# Patient Record
Sex: MALE | Race: WHITE | NOT HISPANIC OR LATINO | Employment: OTHER | URBAN - METROPOLITAN AREA
[De-identification: names, ages, dates, MRNs, and addresses within clinical notes are randomized per-mention and may not be internally consistent; named-entity substitution may affect disease eponyms.]

---

## 2021-02-23 DIAGNOSIS — Z23 NEED FOR VACCINATION: ICD-10-CM

## 2022-06-13 ENCOUNTER — HOSPITAL ENCOUNTER (OUTPATIENT)
Facility: MEDICAL CENTER | Age: 72
End: 2022-06-13
Attending: OPHTHALMOLOGY | Admitting: OPHTHALMOLOGY
Payer: COMMERCIAL

## 2022-06-13 ENCOUNTER — PRE-ADMISSION TESTING (OUTPATIENT)
Dept: ADMISSIONS | Facility: MEDICAL CENTER | Age: 72
End: 2022-06-13
Attending: OPHTHALMOLOGY
Payer: COMMERCIAL

## 2022-06-14 RX ORDER — BALANCED SALT SOLUTION ENRICHED WITH BICARBONATE, DEXTROSE, AND GLUTATHIONE
KIT INTRAOCULAR
Status: DISCONTINUED
Start: 2022-06-14 | End: 2022-06-14 | Stop reason: HOSPADM

## 2022-06-14 RX ORDER — SODIUM CHLORIDE, SODIUM LACTATE, POTASSIUM CHLORIDE, CALCIUM CHLORIDE 600; 310; 30; 20 MG/100ML; MG/100ML; MG/100ML; MG/100ML
INJECTION, SOLUTION INTRAVENOUS CONTINUOUS
Status: CANCELLED | OUTPATIENT
Start: 2022-06-14 | End: 2022-06-14

## 2023-09-07 ENCOUNTER — APPOINTMENT (OUTPATIENT)
Dept: RADIOLOGY | Facility: IMAGING CENTER | Age: 73
End: 2023-09-07
Attending: NURSE PRACTITIONER
Payer: COMMERCIAL

## 2023-09-07 ENCOUNTER — OFFICE VISIT (OUTPATIENT)
Dept: URGENT CARE | Facility: CLINIC | Age: 73
End: 2023-09-07
Payer: COMMERCIAL

## 2023-09-07 VITALS
BODY MASS INDEX: 29.03 KG/M2 | HEART RATE: 68 BPM | SYSTOLIC BLOOD PRESSURE: 114 MMHG | HEIGHT: 74 IN | RESPIRATION RATE: 16 BRPM | WEIGHT: 226.2 LBS | OXYGEN SATURATION: 97 % | TEMPERATURE: 97.6 F | DIASTOLIC BLOOD PRESSURE: 76 MMHG

## 2023-09-07 DIAGNOSIS — G47.00 INSOMNIA, UNSPECIFIED TYPE: ICD-10-CM

## 2023-09-07 DIAGNOSIS — M41.86 LEVOSCOLIOSIS OF LUMBAR SPINE: ICD-10-CM

## 2023-09-07 DIAGNOSIS — M51.36 DDD (DEGENERATIVE DISC DISEASE), LUMBAR: ICD-10-CM

## 2023-09-07 DIAGNOSIS — M54.50 LUMBAR SPINE PAIN: ICD-10-CM

## 2023-09-07 PROBLEM — M77.8 LEFT SHOULDER TENDINITIS: Status: ACTIVE | Noted: 2020-01-14

## 2023-09-07 PROBLEM — M25.819 AFFECTIONS OF SHOULDER REGION: Status: ACTIVE | Noted: 2020-06-09

## 2023-09-07 PROBLEM — J30.89 ENVIRONMENTAL AND SEASONAL ALLERGIES: Status: ACTIVE | Noted: 2018-09-04

## 2023-09-07 PROBLEM — R03.0 ELEVATED BP WITHOUT DIAGNOSIS OF HYPERTENSION: Status: ACTIVE | Noted: 2018-09-04

## 2023-09-07 PROBLEM — R94.5 ABNORMAL RESULTS OF LIVER FUNCTION STUDIES: Status: ACTIVE | Noted: 2020-06-09

## 2023-09-07 PROCEDURE — 72100 X-RAY EXAM L-S SPINE 2/3 VWS: CPT | Mod: TC | Performed by: RADIOLOGY

## 2023-09-07 PROCEDURE — 3074F SYST BP LT 130 MM HG: CPT | Performed by: NURSE PRACTITIONER

## 2023-09-07 PROCEDURE — 99203 OFFICE O/P NEW LOW 30 MIN: CPT | Performed by: NURSE PRACTITIONER

## 2023-09-07 PROCEDURE — 3078F DIAST BP <80 MM HG: CPT | Performed by: NURSE PRACTITIONER

## 2023-09-07 RX ORDER — MELOXICAM 7.5 MG/1
7.5 TABLET ORAL DAILY
Qty: 30 TABLET | Refills: 0 | Status: SHIPPED | OUTPATIENT
Start: 2023-09-07 | End: 2023-10-18 | Stop reason: SDUPTHER

## 2023-09-07 RX ORDER — BACLOFEN 5 MG/1
5-10 TABLET ORAL 3 TIMES DAILY PRN
Qty: 15 TABLET | Refills: 0 | Status: SHIPPED | OUTPATIENT
Start: 2023-09-07 | End: 2023-09-26 | Stop reason: SDUPTHER

## 2023-09-07 ASSESSMENT — ENCOUNTER SYMPTOMS
PARESTHESIAS: 0
WEAKNESS: 0
NUMBNESS: 0
BACK PAIN: 1
BOWEL INCONTINENCE: 0

## 2023-09-07 NOTE — PROGRESS NOTES
"  Subjective:     Tim Doty is a 73 y.o. male who presents for Back Pain (Pt states seen a chiropractor, has been doing a lot of moving and house work x couple months, center of lower back pain, sharp pain, requesting xray to see if align  )      Presents with lower mid back pain for a few months. Was having muscle spasms. Stating he feels he has to increase his effort with forward flexion. At rest, the pain is dull. Change of position increase pain. Denies a specific injury, reporting he has been doing work around the house. Hx of occasional back pain. Saw a chiropractor.    Also inquiring about gabapentin for sleep. Had trailed some his wife had, which helped with his sleep.      Back Pain  This is a recurrent problem. The current episode started more than 1 month ago. Pertinent negatives include no bladder incontinence, bowel incontinence, numbness, paresthesias, perianal numbness or weakness.       Past Medical History:   Diagnosis Date    Alcohol use     \"3 to 4 drinks daily\"       Past Surgical History:   Procedure Laterality Date    SHOULDER ARTHROSCOPY W/ ROTATOR CUFF REPAIR  12/14/2012    Performed by Cinthya Aiken M.D. at SURGERY [facility]S    SHOULDER DECOMPRESSION ARTHROSCOPIC  12/14/2012    Performed by Cinthya Aiken M.D. at SURGERY [facility]S    CLAVICLE DISTAL EXCISION  12/14/2012    Performed by Cinthya Aiken M.D. at SURGERY [facility] ORS    CHOLECYSTECTOMY  1993       Social History     Socioeconomic History    Marital status:      Spouse name: Not on file    Number of children: Not on file    Years of education: Not on file    Highest education level: Not on file   Occupational History    Not on file   Tobacco Use    Smoking status: Never    Smokeless tobacco: Never   Vaping Use    Vaping Use: Never used   Substance and Sexual Activity    Alcohol use: Yes     Comment: \"3 to 4 per day\"    Drug use: No    Sexual activity: Not on file   Other Topics " "Concern    Not on file   Social History Narrative    Not on file     Social Determinants of Health     Financial Resource Strain: Not on file   Food Insecurity: Not on file   Transportation Needs: Not on file   Physical Activity: Not on file   Stress: Not on file   Social Connections: Not on file   Intimate Partner Violence: Not on file   Housing Stability: Not on file        Family History   Problem Relation Age of Onset     () Unknown         Allergies   Allergen Reactions    Pollen Extract      Other reaction(s): Cough  Cough, sneeze, runny nose       Review of Systems   Gastrointestinal:  Negative for bowel incontinence.   Genitourinary:  Negative for bladder incontinence.   Musculoskeletal:  Positive for back pain.   Neurological:  Negative for sensory change, weakness, numbness and paresthesias.   All other systems reviewed and are negative.       Objective:   /76 (BP Location: Left arm, Patient Position: Sitting, BP Cuff Size: Adult)   Pulse 68   Temp 36.4 °C (97.6 °F) (Temporal)   Resp 16   Ht 1.88 m (6' 2\")   Wt 103 kg (226 lb 3.2 oz)   SpO2 97%   BMI 29.04 kg/m²     Physical Exam  Vitals reviewed.   Pulmonary:      Effort: Pulmonary effort is normal. No respiratory distress.   Musculoskeletal:      Comments: Points to mid lumbar spine as area of pain. Area is non-tender to palpation. Pain reporoduced with ROM. Bilateral leg strength is equal, 5/5.    Skin:     General: Skin is warm and dry.   Neurological:      Mental Status: He is alert and oriented to person, place, and time.      Deep Tendon Reflexes:      Reflex Scores:       Patellar reflexes are 2+ on the right side and 2+ on the left side.        Assessment/Plan:   1. Lumbar spine pain  - Referral to establish with [facility]  - DX-LUMBAR SPINE-2 OR 3 VIEWS; Future  - meloxicam (MOBIC) 7.5 MG Tab; Take 1 Tablet by mouth every day.  Dispense: 30 Tablet; Refill: 0  - baclofen (LIORESAL) 5 MG Tab; Take 1-2 Tablets by mouth 3 times a day as " needed (Back pain, muscle spasm.).  Dispense: 15 Tablet; Refill: 0    2. DDD (degenerative disc disease), lumbar  - Referral to establish with [facility]CP  - DX-LUMBAR SPINE-2 OR 3 VIEWS; Future  - meloxicam (MOBIC) 7.5 MG Tab; Take 1 Tablet by mouth every day.  Dispense: 30 Tablet; Refill: 0    3. Levoscoliosis of lumbar spine  - meloxicam (MOBIC) 7.5 MG Tab; Take 1 Tablet by mouth every day.  Dispense: 30 Tablet; Refill: 0    4. Insomnia, unspecified type  - Referral to establish with [facility]    -Take medication as prescribed.   -Can also take OTC Tylenol as directed for pain.   -Temperature Therapy: Heat or ice, whichever feels better.  -Gentle ROM back stretches and exercises.   -Resume activity as tolerated.    Follow up with your primary care doctor. Follow up for persistent, new, or worsening pain. Emergently for weakness, loss of bowel or bladder, groin pain or numbness, fevers.    -Imaging ordered with midline spinal pain, and duration of symptoms.  No saddle anesthesia, leg weakness, or sensory changes. Discussed sedative effects of muscle relaxers. Advised f/u with PCP for continued difficulty with sleep.     Differential diagnosis, natural history, supportive care, and indications for immediate follow-up discussed.

## 2023-09-07 NOTE — PATIENT INSTRUCTIONS
-Take medication as prescribed.   -Can also take OTC Tylenol as directed for pain.   -Temperature Therapy: Heat or ice, whichever feels better.  -Gentle ROM back stretches and exercises.   -Resume activity as tolerated.    Follow up with your primary care doctor. Follow up for persistent, new, or worsening pain. Emergently for weakness, loss of bowel or bladder, groin pain or numbness, fevers.

## 2023-09-12 ASSESSMENT — ENCOUNTER SYMPTOMS
PERIANAL NUMBNESS: 0
SENSORY CHANGE: 0

## 2023-09-15 ENCOUNTER — TELEPHONE (OUTPATIENT)
Dept: HEALTH INFORMATION MANAGEMENT | Facility: OTHER | Age: 73
End: 2023-09-15

## 2023-09-18 ENCOUNTER — TELEPHONE (OUTPATIENT)
Dept: HEALTH INFORMATION MANAGEMENT | Facility: OTHER | Age: 73
End: 2023-09-18
Payer: MEDICARE

## 2023-09-19 ENCOUNTER — OFFICE VISIT (OUTPATIENT)
Dept: MEDICAL GROUP | Facility: PHYSICIAN GROUP | Age: 73
End: 2023-09-19
Attending: NURSE PRACTITIONER
Payer: MEDICARE

## 2023-09-19 VITALS
OXYGEN SATURATION: 96 % | HEART RATE: 62 BPM | HEIGHT: 74 IN | WEIGHT: 229.5 LBS | RESPIRATION RATE: 14 BRPM | DIASTOLIC BLOOD PRESSURE: 76 MMHG | SYSTOLIC BLOOD PRESSURE: 124 MMHG | TEMPERATURE: 97.1 F | BODY MASS INDEX: 29.45 KG/M2
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
## 2023-09-19 DIAGNOSIS — M77.8 LEFT SHOULDER TENDINITIS: ICD-10-CM

## 2023-09-19 DIAGNOSIS — M85.812 OSTEOPENIA OF LEFT SHOULDER: ICD-10-CM

## 2023-09-19 PROCEDURE — 99214 OFFICE O/P EST MOD 30 MIN: CPT | Performed by: STUDENT IN AN ORGANIZED HEALTH CARE EDUCATION/TRAINING PROGRAM

## 2023-09-19 PROCEDURE — 3074F SYST BP LT 130 MM HG: CPT | Performed by: STUDENT IN AN ORGANIZED HEALTH CARE EDUCATION/TRAINING PROGRAM

## 2023-09-19 PROCEDURE — 3078F DIAST BP <80 MM HG: CPT | Performed by: STUDENT IN AN ORGANIZED HEALTH CARE EDUCATION/TRAINING PROGRAM

## 2023-09-19 RX ORDER — HYDROXYZINE HYDROCHLORIDE 25 MG/1
25 TABLET, FILM COATED ORAL 3 TIMES DAILY PRN
Qty: 30 TABLET | Refills: 0 | Status: SHIPPED | OUTPATIENT
Start: 2023-09-19 | End: 2023-10-18

## 2023-09-19 ASSESSMENT — PATIENT HEALTH QUESTIONNAIRE - PHQ9: CLINICAL INTERPRETATION OF PHQ2 SCORE: 0

## 2023-09-19 NOTE — LETTER
[employer] [facility]  Chris Anthony D.O.  [address]  [place] [address]  Fax: [phone]   Authorization for Release/Disclosure of   Protected Health Information   Name: TIM DOTY : 1950 SSN: [id number]   Address: [address] [place] [address] [place] [address] Phone:    [phone] (home)    I authorize the entity listed below to release/disclose the PHI below to:   [facility]/Chris Anthony D.O. and Chris Anthony D.O.   Provider or Entity Name:  [facility]   Address   City, State, [place]   Phone:      Fax:     Reason for request: continuity of care   Information to be released:    [  x] LAST COLONOSCOPY,  including any PATH REPORT and follow-up  [  ] LAST FIT/COLOGUARD RESULT [  ] LAST DEXA  [  ] LAST MAMMOGRAM  [  ] LAST PAP  [  ] LAST LABS [  ] RETINA EXAM REPORT  [  ] IMMUNIZATION RECORDS  [  ] Release all info      [  ] Check here and initial the line next to each item to release ALL health information INCLUDING  _____ Care and treatment for drug and / or alcohol abuse  _____ HIV testing, infection status, or AIDS  _____ Genetic Testing    DATES OF SERVICE OR TIME PERIOD TO BE DISCLOSED: _____________  I understand and acknowledge that:  * This Authorization may be revoked at any time by you in writing, except if your health information has already been used or disclosed.  * Your health information that will be used or disclosed as a result of you signing this authorization could be re-disclosed by the recipient. If this occurs, your re-disclosed health information may no longer be protected by State or Federal laws.  * You may refuse to sign this Authorization. Your refusal will not affect your ability to obtain treatment.  * This Authorization becomes effective upon signing and will  on (date) __________.      If no date is indicated, this Authorization will  one (1) year from the signature date.    Name: Tim Doty  Signature: Date:    9/19/2023     PLEASE FAX REQUESTED RECORDS BACK TO: [phone]

## 2023-09-20 NOTE — ASSESSMENT & PLAN NOTE
Reviewed x-ray from 2021 showing arthritis in his shoulder.  We will repeat a x-ray to assess for advancement of arthritis.  He would like to try steroid injection which he had in the past which resolved this pain for over a few years.    Follow-up 2 weeks for steroid injection

## 2023-09-20 NOTE — PROGRESS NOTES
HISTORY OF PRESENT ILLNESS: Tim is a pleasant 73 y.o. male, established patient who presents today to discuss medical problems as listed below:    Problem   Osteopenia of Left Shoulder   Bite, Insect    He reports that he was kicking over a hole and found to be a hornet/wasp nest.  Has multiple bites over his body the most concerning one on his left ankle which she has been scratching and itching and has been red and crusted ever since.     Left Shoulder Tendinitis    History of left shoulder tendinitis, this has been well for the last couple years but now returning.  Denies any trauma to the area.  Reports has trouble lifting and occasional pain at night.  Most of the pain occurs in certain movements such as reaching above his head with an outstretched arm          Current Outpatient Medications Ordered in Epic   Medication Sig Dispense Refill    hydrOXYzine HCl (ATARAX) 25 MG Tab Take 1 Tablet by mouth 3 times a day as needed for Itching. 30 Tablet 0    betamethasone valerate (VALISONE) 0.1 % Cream Apply 1 Application topically 2 times a day for 14 days. 45 g 0    meloxicam (MOBIC) 7.5 MG Tab Take 1 Tablet by mouth every day. 30 Tablet 0    baclofen (LIORESAL) 5 MG Tab Take 1-2 Tablets by mouth 3 times a day as needed (Back pain, muscle spasm.). 15 Tablet 0     No current Epic-ordered facility-administered medications on file.       Review of systems:  Per HPI    Patient Active Problem List    Diagnosis Date Noted    Osteopenia of left shoulder 09/19/2023    Bite, insect 09/19/2023    Abnormal results of liver function studies 06/09/2020    Affections of shoulder region 06/09/2020    Left shoulder tendinitis 01/14/2020    Elevated BP without diagnosis of hypertension 09/04/2018    Environmental and seasonal allergies 09/04/2018    Complete rupture of rotator cuff 12/14/2012     Past Surgical History:   Procedure Laterality Date    SHOULDER ARTHROSCOPY W/ ROTATOR CUFF REPAIR  12/14/2012    Performed by Cinthya  "CARLOS Aiken at SURGERY [facility] ORS    SHOULDER DECOMPRESSION ARTHROSCOPIC  12/14/2012    Performed by Cinthya Aiken M.D. at SURGERY [facility] ORS    CLAVICLE DISTAL EXCISION  12/14/2012    Performed by Cinthya Aiken M.D. at SURGERY [facility] ORS    CHOLECYSTECTOMY  1993     Social History     Tobacco Use    Smoking status: Never    Smokeless tobacco: Never   Vaping Use    Vaping Use: Never used   Substance Use Topics    Alcohol use: Yes     Comment: \"3 to 4 per day\"    Drug use: No      Family History   Problem Relation Age of Onset     () Unknown      Current Outpatient Medications   Medication Sig Dispense Refill    hydrOXYzine HCl (ATARAX) 25 MG Tab Take 1 Tablet by mouth 3 times a day as needed for Itching. 30 Tablet 0    betamethasone valerate (VALISONE) 0.1 % Cream Apply 1 Application topically 2 times a day for 14 days. 45 g 0    meloxicam (MOBIC) 7.5 MG Tab Take 1 Tablet by mouth every day. 30 Tablet 0    baclofen (LIORESAL) 5 MG Tab Take 1-2 Tablets by mouth 3 times a day as needed (Back pain, muscle spasm.). 15 Tablet 0     No current facility-administered medications for this visit.       Allergies:  Allergies   Allergen Reactions    Pollen Extract      Other reaction(s): Cough  Cough, sneeze, runny nose       Allergies, past medical history, past surgical history, family history, social history reviewed and updated.    Objective:    /76 (BP Location: Right arm, Patient Position: Sitting, BP Cuff Size: Adult)   Pulse 62   Temp 36.2 °C (97.1 °F) (Temporal)   Resp 14   Ht 1.88 m (6' 2\")   Wt 104 kg (229 lb 8 oz)   SpO2 96%   BMI 29.47 kg/m²    Body mass index is 29.47 kg/m².    Physical exam:  General: Normal appearance, no acute distress, not ill-appearing  HEENT: EOM intact, conjunctiva normal limits, negative right/left eye discharge.  Sclera anicteric  Cardiovascular: Normal rate and rhythm, no murmurs  Pulmonary: No respiratory distress, no wheezing, no rales, " breath sounds normal.  Musculoskeletal: No edema bilaterally  Skin: Left ankle with 8 cm diameter area of scaly, dry skin with erythematous base.  No discharge or erythema not extending beyond the border  Neurological: No focal deficits, normal gait  Psychiatric: Mood within normal limits    Left shoulder negative drop arm test, negative Hawkin's test, negative internal rotation lag test.  Pain with empty can test.  Range of motion within normal limits, no other lesions or abnormalities.    Assessment/Plan:    Problem List Items Addressed This Visit       Left shoulder tendinitis     Reviewed x-ray from 2021 showing arthritis in his shoulder.  We will repeat a x-ray to assess for advancement of arthritis.  He would like to try steroid injection which he had in the past which resolved this pain for over a few years.    Follow-up 2 weeks for steroid injection         Relevant Orders    DX-SHOULDER 2+ LEFT    Osteopenia of left shoulder     Incidentally found on x-ray from 2021.  We will order a DEXA scan to assess         Relevant Orders    DS-BONE DENSITY STUDY (DEXA)    Bite, insect     Hydroxyzine at night for itch  Lesion on left ankle seems to be dermatitis that is forming.  Will Rx a moderate potency steroid cream to use twice daily for 14 days.  We will follow-up in 2 weeks to see if there is any improvement.  Advised to  a CeraVe ointment from the pharmacy to use after a shower to help with moisture         Relevant Medications    hydrOXYzine HCl (ATARAX) 25 MG Tab        Return in about 2 weeks (around 10/3/2023) for Steroid injection left shoulder.

## 2023-09-20 NOTE — ASSESSMENT & PLAN NOTE
Hydroxyzine at night for itch  Lesion on left ankle seems to be dermatitis that is forming.  Will Rx a moderate potency steroid cream to use twice daily for 14 days.  We will follow-up in 2 weeks to see if there is any improvement.  Advised to  a CeraVe ointment from the pharmacy to use after a shower to help with moisture

## 2023-09-22 ENCOUNTER — HOSPITAL ENCOUNTER (OUTPATIENT)
Dept: RADIOLOGY | Facility: MEDICAL CENTER | Age: 73
End: 2023-09-22
Attending: STUDENT IN AN ORGANIZED HEALTH CARE EDUCATION/TRAINING PROGRAM
Payer: MEDICARE

## 2023-09-22 DIAGNOSIS — M77.8 LEFT SHOULDER TENDINITIS: ICD-10-CM

## 2023-09-22 DIAGNOSIS — M85.812 OSTEOPENIA OF LEFT SHOULDER: ICD-10-CM

## 2023-09-22 PROCEDURE — 77080 DXA BONE DENSITY AXIAL: CPT

## 2023-09-22 PROCEDURE — 73030 X-RAY EXAM OF SHOULDER: CPT | Mod: LT

## 2023-09-26 ENCOUNTER — OFFICE VISIT (OUTPATIENT)
Dept: MEDICAL GROUP | Facility: PHYSICIAN GROUP | Age: 73
End: 2023-09-26
Payer: MEDICARE

## 2023-09-26 VITALS
DIASTOLIC BLOOD PRESSURE: 76 MMHG | SYSTOLIC BLOOD PRESSURE: 144 MMHG | HEIGHT: 74 IN | TEMPERATURE: 98 F | RESPIRATION RATE: 12 BRPM | OXYGEN SATURATION: 95 % | BODY MASS INDEX: 29.31 KG/M2 | WEIGHT: 228.4 LBS | HEART RATE: 72 BPM

## 2023-09-26 DIAGNOSIS — Z11.59 NEED FOR HEPATITIS C SCREENING TEST: ICD-10-CM

## 2023-09-26 DIAGNOSIS — M54.50 LUMBAR SPINE PAIN: ICD-10-CM

## 2023-09-26 DIAGNOSIS — Z00.00 HEALTHCARE MAINTENANCE: ICD-10-CM

## 2023-09-26 DIAGNOSIS — M77.8 LEFT SHOULDER TENDINITIS: ICD-10-CM

## 2023-09-26 DIAGNOSIS — R94.5 ABNORMAL RESULTS OF LIVER FUNCTION STUDIES: ICD-10-CM

## 2023-09-26 PROCEDURE — 20610 DRAIN/INJ JOINT/BURSA W/O US: CPT | Mod: LT | Performed by: STUDENT IN AN ORGANIZED HEALTH CARE EDUCATION/TRAINING PROGRAM

## 2023-09-26 PROCEDURE — 3077F SYST BP >= 140 MM HG: CPT | Performed by: STUDENT IN AN ORGANIZED HEALTH CARE EDUCATION/TRAINING PROGRAM

## 2023-09-26 PROCEDURE — 3078F DIAST BP <80 MM HG: CPT | Performed by: STUDENT IN AN ORGANIZED HEALTH CARE EDUCATION/TRAINING PROGRAM

## 2023-09-26 PROCEDURE — 99213 OFFICE O/P EST LOW 20 MIN: CPT | Mod: 25 | Performed by: STUDENT IN AN ORGANIZED HEALTH CARE EDUCATION/TRAINING PROGRAM

## 2023-09-26 RX ORDER — TRIAMCINOLONE ACETONIDE 40 MG/ML
40 INJECTION, SUSPENSION INTRA-ARTICULAR; INTRAMUSCULAR ONCE
Status: COMPLETED | OUTPATIENT
Start: 2023-09-26 | End: 2023-09-26

## 2023-09-26 RX ORDER — BACLOFEN 5 MG/1
5-10 TABLET ORAL 3 TIMES DAILY PRN
Qty: 30 TABLET | Refills: 0 | Status: SHIPPED | OUTPATIENT
Start: 2023-09-26 | End: 2023-10-18

## 2023-09-26 RX ADMIN — Medication 1 ML: at 17:35

## 2023-09-26 RX ADMIN — TRIAMCINOLONE ACETONIDE 40 MG: 40 INJECTION, SUSPENSION INTRA-ARTICULAR; INTRAMUSCULAR at 17:34

## 2023-09-26 NOTE — PROGRESS NOTES
HISTORY OF PRESENT ILLNESS: Tim is a pleasant 73 y.o. male, established patient who presents today to discuss medical problems as listed below:    None  Left shoulder pain  Patient reports a chronic history of left shoulder pain.  Has received steroid injections in the past with good benefit.  Currently having pain with reaching above his head.  Denies any trauma      Current Outpatient Medications Ordered in Epic   Medication Sig Dispense Refill    baclofen (LIORESAL) 5 MG Tab Take 1-2 Tablets by mouth 3 times a day as needed (Back pain, muscle spasm.). 30 Tablet 0    hydrOXYzine HCl (ATARAX) 25 MG Tab Take 1 Tablet by mouth 3 times a day as needed for Itching. 30 Tablet 0    betamethasone valerate (VALISONE) 0.1 % Cream Apply 1 Application topically 2 times a day for 14 days. 45 g 0    meloxicam (MOBIC) 7.5 MG Tab Take 1 Tablet by mouth every day. 30 Tablet 0     No current Epic-ordered facility-administered medications on file.       Review of systems:  Per HPI    Patient Active Problem List    Diagnosis Date Noted    Osteopenia of left shoulder 09/19/2023    Bite, insect 09/19/2023    Abnormal results of liver function studies 06/09/2020    Affections of shoulder region 06/09/2020    Left shoulder tendinitis 01/14/2020    Elevated BP without diagnosis of hypertension 09/04/2018    Environmental and seasonal allergies 09/04/2018    Complete rupture of rotator cuff 12/14/2012     Past Surgical History:   Procedure Laterality Date    SHOULDER ARTHROSCOPY W/ ROTATOR CUFF REPAIR  12/14/2012    Performed by Cinthya Aiken M.D. at SURGERY [facility] ORS    SHOULDER DECOMPRESSION ARTHROSCOPIC  12/14/2012    Performed by Cinthya Aiken M.D. at SURGERY [facility] ORS    CLAVICLE DISTAL EXCISION  12/14/2012    Performed by Cinthya Aiken M.D. at SURGERY [facility] ORS    CHOLECYSTECTOMY  1993     Social History     Tobacco Use    Smoking status: Never    Smokeless tobacco: Never   Vaping Use     "Vaping Use: Never used   Substance Use Topics    Alcohol use: Yes     Comment: \"3 to 4 per day\"    Drug use: No      Family History   Problem Relation Age of Onset     () Unknown      Current Outpatient Medications   Medication Sig Dispense Refill    baclofen (LIORESAL) 5 MG Tab Take 1-2 Tablets by mouth 3 times a day as needed (Back pain, muscle spasm.). 30 Tablet 0    hydrOXYzine HCl (ATARAX) 25 MG Tab Take 1 Tablet by mouth 3 times a day as needed for Itching. 30 Tablet 0    betamethasone valerate (VALISONE) 0.1 % Cream Apply 1 Application topically 2 times a day for 14 days. 45 g 0    meloxicam (MOBIC) 7.5 MG Tab Take 1 Tablet by mouth every day. 30 Tablet 0     No current facility-administered medications for this visit.       Allergies:  Allergies   Allergen Reactions    Pollen Extract      Other reaction(s): Cough  Cough, sneeze, runny nose       Allergies, past medical history, past surgical history, family history, social history reviewed and updated.    Objective:    BP (!) 144/76 (BP Location: Left arm, Patient Position: Sitting, BP Cuff Size: Adult)   Pulse 72   Temp 36.7 °C (98 °F) (Temporal)   Resp 12   Ht 1.88 m (6' 2\")   Wt 104 kg (228 lb 6.3 oz)   SpO2 95%   BMI 29.32 kg/m²    Body mass index is 29.32 kg/m².    Physical exam:  General: Normal appearance, no acute distress, not ill-appearing  Musculoskeletal: Shoulder no deformities, negative drop arm test, negative Jacobs, negative internal rotation lag test, negative empty can test.      Problem List Items Addressed This Visit       Abnormal results of liver function studies    Relevant Orders    Comp Metabolic Panel    Left shoulder tendinitis    Relevant Orders    Consent for all Surgical, Special Diagnostic or Therapeutic Procedures    Joint Inj - LG: shoulder, L glenohumeral     Other Visit Diagnoses       Lumbar spine pain        Relevant Medications    baclofen (LIORESAL) 5 MG Tab    Need for hepatitis C screening test        Relevant " Orders    HEP C VIRUS ANTIBODY    Healthcare maintenance        Relevant Orders    HEP C VIRUS ANTIBODY    LIPID PANEL    CBC WITHOUT DIFFERENTIAL             Return in about 6 months (around 3/26/2024) for Annual.

## 2023-09-26 NOTE — PROCEDURES
Joint Inj - LG: shoulder, L glenohumeral on 9/26/2023 4:57 PM  Details: (27-gauge) needle, posterior approach  Medications: (1M male of 40 mg/mL Kenalog  1 mL of Xylocaine 1% 10 mg/mL.)  Outcome: tolerated well, no immediate complications  Procedure, treatment alternatives, risks and benefits explained, specific risks discussed. Consent was given by the patient. Immediately prior to procedure a time out was called to verify the correct patient, procedure, equipment, support staff and site/side marked as required. Patient was prepped and draped in the usual sterile fashion.

## 2023-10-18 ENCOUNTER — OFFICE VISIT (OUTPATIENT)
Dept: MEDICAL GROUP | Facility: PHYSICIAN GROUP | Age: 73
End: 2023-10-18
Payer: MEDICARE

## 2023-10-18 VITALS
WEIGHT: 230.82 LBS | TEMPERATURE: 96.8 F | SYSTOLIC BLOOD PRESSURE: 136 MMHG | RESPIRATION RATE: 14 BRPM | DIASTOLIC BLOOD PRESSURE: 78 MMHG | HEIGHT: 74 IN | OXYGEN SATURATION: 96 % | BODY MASS INDEX: 29.62 KG/M2 | HEART RATE: 74 BPM

## 2023-10-18 DIAGNOSIS — M41.86 LEVOSCOLIOSIS OF LUMBAR SPINE: ICD-10-CM

## 2023-10-18 DIAGNOSIS — M51.36 DDD (DEGENERATIVE DISC DISEASE), LUMBAR: ICD-10-CM

## 2023-10-18 DIAGNOSIS — M54.50 LUMBAR SPINE PAIN: ICD-10-CM

## 2023-10-18 DIAGNOSIS — M25.512 CHRONIC LEFT SHOULDER PAIN: ICD-10-CM

## 2023-10-18 DIAGNOSIS — G89.29 CHRONIC LEFT SHOULDER PAIN: ICD-10-CM

## 2023-10-18 PROCEDURE — 3078F DIAST BP <80 MM HG: CPT | Performed by: STUDENT IN AN ORGANIZED HEALTH CARE EDUCATION/TRAINING PROGRAM

## 2023-10-18 PROCEDURE — 99213 OFFICE O/P EST LOW 20 MIN: CPT | Performed by: STUDENT IN AN ORGANIZED HEALTH CARE EDUCATION/TRAINING PROGRAM

## 2023-10-18 PROCEDURE — 3075F SYST BP GE 130 - 139MM HG: CPT | Performed by: STUDENT IN AN ORGANIZED HEALTH CARE EDUCATION/TRAINING PROGRAM

## 2023-10-18 RX ORDER — GABAPENTIN 300 MG/1
300 CAPSULE ORAL
COMMUNITY
Start: 2023-10-04

## 2023-10-18 RX ORDER — BACLOFEN 10 MG/1
0.5 TABLET ORAL 3 TIMES DAILY
COMMUNITY
Start: 2023-10-04 | End: 2024-01-31

## 2023-10-18 RX ORDER — HYDROXYZINE HYDROCHLORIDE 25 MG/1
1 TABLET, FILM COATED ORAL 3 TIMES DAILY
COMMUNITY
Start: 2023-10-04 | End: 2023-10-18

## 2023-10-18 RX ORDER — MELOXICAM 15 MG/1
15 TABLET ORAL DAILY
Qty: 60 TABLET | Refills: 1 | Status: SHIPPED | OUTPATIENT
Start: 2023-10-18

## 2023-10-18 NOTE — ASSESSMENT & PLAN NOTE
Suspect tendinitis or at worse a tear  Mri ordered  Referral to ortho  Increase mobic to 15mg daily  Can use tylenol if needed to supplement

## 2023-10-18 NOTE — PROGRESS NOTES
HISTORY OF PRESENT ILLNESS: Tim is a pleasant 73 y.o. male, established patient who presents today to discuss medical problems as listed below:    Problem   Chronic Left Shoulder Pain    Patient has a history of left shoulder for which he is taking steroid injections in the past.  He had a recent injury few weeks ago that flared up his pain.  He was reaching out to grab something and while lifting his shoulder gave out.  X-ray showed some arthritis.  We tried a steroid injection but he reports that he had no benefit from this and is still having significant pain and debility.  He is having trouble lifting his arm above 90 degrees and reaching for anything above his head.          Current Outpatient Medications Ordered in Epic   Medication Sig Dispense Refill    gabapentin (NEURONTIN) 300 MG Cap 300 mg.      baclofen (LIORESAL) 10 MG Tab Take 0.5 Tablets by mouth 3 times a day.      meloxicam (MOBIC) 15 MG tablet Take 1 Tablet by mouth every day. 60 Tablet 1     No current Epic-ordered facility-administered medications on file.       Review of systems:  Per HPI    Patient Active Problem List    Diagnosis Date Noted    Chronic left shoulder pain 10/18/2023    Osteopenia of left shoulder 09/19/2023    Bite, insect 09/19/2023    Abnormal results of liver function studies 06/09/2020    Affections of shoulder region 06/09/2020    Left shoulder tendinitis 01/14/2020    Elevated BP without diagnosis of hypertension 09/04/2018    Environmental and seasonal allergies 09/04/2018    Complete rupture of rotator cuff 12/14/2012     Past Surgical History:   Procedure Laterality Date    SHOULDER ARTHROSCOPY W/ ROTATOR CUFF REPAIR  12/14/2012    Performed by Cinthya Aiken M.D. at [facility] ORS    SHOULDER DECOMPRESSION ARTHROSCOPIC  12/14/2012    Performed by Cinthya Aiken M.D. at [facility] ORS    CLAVICLE DISTAL EXCISION  12/14/2012    Performed by Cinthya Aiken M.D. at [facility]  "ORS    CHOLECYSTECTOMY  1993     Social History     Tobacco Use    Smoking status: Never    Smokeless tobacco: Never   Vaping Use    Vaping Use: Never used   Substance Use Topics    Alcohol use: Yes     Comment: \"3 to 4 per day\"    Drug use: No      Family History   Problem Relation Age of Onset     () Unknown      Current Outpatient Medications   Medication Sig Dispense Refill    gabapentin (NEURONTIN) 300 MG Cap 300 mg.      baclofen (LIORESAL) 10 MG Tab Take 0.5 Tablets by mouth 3 times a day.      meloxicam (MOBIC) 15 MG tablet Take 1 Tablet by mouth every day. 60 Tablet 1     No current facility-administered medications for this visit.       Allergies:  Allergies   Allergen Reactions    Pollen Extract      Other reaction(s): Cough  Cough, sneeze, runny nose       Allergies, past medical history, past surgical history, family history, social history reviewed and updated.    Objective:    /78 (BP Location: Left arm, Patient Position: Sitting, BP Cuff Size: Adult)   Pulse 74   Temp 36 °C (96.8 °F) (Temporal)   Resp 14   Ht 1.88 m (6' 2\")   Wt 105 kg (230 lb 13.2 oz)   SpO2 96%   BMI 29.64 kg/m²    Body mass index is 29.64 kg/m².    Physical exam:  General: Normal appearance, no acute distress, not ill-appearing  HEENT: EOM intact, conjunctiva normal limits, negative right/left eye discharge.  Sclera anicteric  Musculoskeletal: L shoulder pos neer test, postive empty can test. Internal rotation lag test wnl, neg mcallister test    Assessment/Plan:    Problem List Items Addressed This Visit       Chronic left shoulder pain     Suspect tendinitis or at worse a tear  Mri ordered  Referral to ortho  Increase mobic to 15mg daily  Can use tylenol if needed to supplement              Relevant Medications    gabapentin (NEURONTIN) 300 MG Cap    baclofen (LIORESAL) 10 MG Tab    meloxicam (MOBIC) 15 MG tablet    Other Relevant Orders    Referral to Orthopedics    MR-SHOULDER-W/O LEFT     Other Visit Diagnoses       " Lumbar spine pain        Relevant Medications    baclofen (LIORESAL) 10 MG Tab    meloxicam (MOBIC) 15 MG tablet    DDD (degenerative disc disease), lumbar        Relevant Medications    baclofen (LIORESAL) 10 MG Tab    meloxicam (MOBIC) 15 MG tablet    Levoscoliosis of lumbar spine                 Return if symptoms worsen or fail to improve, for annual.

## 2024-01-10 PROBLEM — M75.122 COMPLETE ROTATOR CUFF TEAR OF LEFT SHOULDER: Status: ACTIVE | Noted: 2024-01-10

## 2024-04-24 ENCOUNTER — TELEPHONE (OUTPATIENT)
Dept: SCHEDULING | Facility: IMAGING CENTER | Age: 74
End: 2024-04-24
Payer: MEDICARE